# Patient Record
Sex: FEMALE | Race: WHITE | NOT HISPANIC OR LATINO | Employment: UNEMPLOYED | URBAN - METROPOLITAN AREA
[De-identification: names, ages, dates, MRNs, and addresses within clinical notes are randomized per-mention and may not be internally consistent; named-entity substitution may affect disease eponyms.]

---

## 2017-06-05 ENCOUNTER — HOSPITAL ENCOUNTER (EMERGENCY)
Facility: HOSPITAL | Age: 42
Discharge: HOME | End: 2017-06-05
Attending: EMERGENCY MEDICINE | Admitting: EMERGENCY MEDICINE
Payer: COMMERCIAL

## 2017-06-05 VITALS
WEIGHT: 162.3 LBS | OXYGEN SATURATION: 98 % | HEART RATE: 82 BPM | RESPIRATION RATE: 18 BRPM | DIASTOLIC BLOOD PRESSURE: 70 MMHG | SYSTOLIC BLOOD PRESSURE: 116 MMHG | TEMPERATURE: 98.3 F

## 2017-06-05 DIAGNOSIS — S39.012A LUMBAR STRAIN, INITIAL ENCOUNTER: Primary | ICD-10-CM

## 2017-06-05 PROCEDURE — 99283 EMERGENCY DEPT VISIT LOW MDM: CPT

## 2017-06-05 PROCEDURE — 96372 THER/PROPH/DIAG INJ SC/IM: CPT

## 2017-06-05 RX ORDER — PREDNISONE 20 MG/1
40 TABLET ORAL ONCE
Status: COMPLETED | OUTPATIENT
Start: 2017-06-05 | End: 2017-06-05

## 2017-06-05 RX ORDER — DIPHENHYDRAMINE HCL 25 MG
50 TABLET ORAL ONCE
Status: COMPLETED | OUTPATIENT
Start: 2017-06-05 | End: 2017-06-05

## 2017-06-05 RX ORDER — NAPROXEN 500 MG/1
500 TABLET ORAL 2 TIMES DAILY PRN
Qty: 15 TABLET | Refills: 0 | Status: SHIPPED | OUTPATIENT
Start: 2017-06-05 | End: 2017-06-19

## 2017-06-05 RX ORDER — METHYLPREDNISOLONE 4 MG/1
TABLET ORAL
Qty: 21 TABLET | Refills: 0 | Status: SHIPPED | OUTPATIENT
Start: 2017-06-05

## 2017-06-05 RX ORDER — ONDANSETRON 4 MG/1
4 TABLET, ORALLY DISINTEGRATING ORAL ONCE
Status: COMPLETED | OUTPATIENT
Start: 2017-06-05 | End: 2017-06-05

## 2017-06-05 RX ORDER — KETOROLAC TROMETHAMINE 30 MG/ML
30 INJECTION, SOLUTION INTRAMUSCULAR; INTRAVENOUS ONCE
Status: COMPLETED | OUTPATIENT
Start: 2017-06-05 | End: 2017-06-05

## 2017-06-05 RX ADMIN — KETOROLAC TROMETHAMINE 30 MG: 30 INJECTION, SOLUTION INTRAMUSCULAR at 20:50

## 2017-06-05 RX ADMIN — PREDNISONE 40 MG: 20 TABLET ORAL at 20:51

## 2017-06-05 RX ADMIN — ONDANSETRON 4 MG: 4 TABLET, ORALLY DISINTEGRATING ORAL at 20:10

## 2017-06-05 RX ADMIN — DIPHENHYDRAMINE HYDROCHLORIDE 50 MG: 25 TABLET ORAL at 20:09

## 2017-06-08 ENCOUNTER — HOSPITAL ENCOUNTER (EMERGENCY)
Facility: HOSPITAL | Age: 42
Discharge: HOME | End: 2017-06-08
Attending: EMERGENCY MEDICINE
Payer: COMMERCIAL

## 2017-06-08 VITALS
RESPIRATION RATE: 18 BRPM | HEART RATE: 71 BPM | DIASTOLIC BLOOD PRESSURE: 76 MMHG | OXYGEN SATURATION: 98 % | WEIGHT: 162.9 LBS | TEMPERATURE: 98.5 F | SYSTOLIC BLOOD PRESSURE: 120 MMHG

## 2017-06-08 DIAGNOSIS — M54.32 SCIATICA OF LEFT SIDE: Primary | ICD-10-CM

## 2017-06-08 PROCEDURE — 99283 EMERGENCY DEPT VISIT LOW MDM: CPT

## 2017-06-08 RX ORDER — DIPHENHYDRAMINE HCL 25 MG
25 TABLET ORAL EVERY 6 HOURS PRN
Status: DISCONTINUED | OUTPATIENT
Start: 2017-06-08 | End: 2017-06-08 | Stop reason: HOSPADM

## 2017-06-08 RX ORDER — LIDOCAINE 50 MG/G
1 PATCH TOPICAL ONCE
Status: DISCONTINUED | OUTPATIENT
Start: 2017-06-08 | End: 2017-06-08 | Stop reason: HOSPADM

## 2017-06-08 RX ORDER — MORPHINE SULFATE 15 MG/1
30 TABLET ORAL ONCE
Status: COMPLETED | OUTPATIENT
Start: 2017-06-08 | End: 2017-06-08

## 2017-06-08 RX ORDER — BUPIVACAINE HYDROCHLORIDE 5 MG/ML
30 INJECTION, SOLUTION EPIDURAL; INTRACAUDAL ONCE
Status: COMPLETED | OUTPATIENT
Start: 2017-06-08 | End: 2017-06-08

## 2017-06-08 RX ORDER — MORPHINE SULFATE 30 MG/1
30 TABLET ORAL EVERY 4 HOURS PRN
Qty: 20 TABLET | Refills: 0 | Status: SHIPPED | OUTPATIENT
Start: 2017-06-08 | End: 2017-06-28

## 2017-06-08 RX ADMIN — LIDOCAINE 1 PATCH: 50 PATCH CUTANEOUS at 19:50

## 2017-06-08 RX ADMIN — MORPHINE SULFATE 30 MG: 15 TABLET ORAL at 18:15

## 2017-06-08 RX ADMIN — BUPIVACAINE HYDROCHLORIDE 10 ML: 5 INJECTION, SOLUTION EPIDURAL; INTRACAUDAL at 18:40

## 2017-06-19 ENCOUNTER — APPOINTMENT (EMERGENCY)
Dept: RADIOLOGY | Facility: HOSPITAL | Age: 42
End: 2017-06-19
Payer: COMMERCIAL

## 2017-06-19 ENCOUNTER — HOSPITAL ENCOUNTER (EMERGENCY)
Facility: HOSPITAL | Age: 42
Discharge: HOME | End: 2017-06-19
Payer: COMMERCIAL

## 2017-06-19 VITALS
OXYGEN SATURATION: 100 % | RESPIRATION RATE: 22 BRPM | DIASTOLIC BLOOD PRESSURE: 87 MMHG | TEMPERATURE: 98.4 F | HEART RATE: 72 BPM | SYSTOLIC BLOOD PRESSURE: 155 MMHG

## 2017-06-19 DIAGNOSIS — M54.50 LOW BACK PAIN RADIATING TO LEFT LEG: Primary | ICD-10-CM

## 2017-06-19 DIAGNOSIS — M79.605 LOW BACK PAIN RADIATING TO LEFT LEG: Primary | ICD-10-CM

## 2017-06-19 PROCEDURE — 99283 EMERGENCY DEPT VISIT LOW MDM: CPT

## 2017-06-19 PROCEDURE — 72100 X-RAY EXAM L-S SPINE 2/3 VWS: CPT

## 2017-06-19 RX ORDER — NAPROXEN 500 MG/1
500 TABLET ORAL 2 TIMES DAILY PRN
Qty: 15 TABLET | Refills: 0 | Status: SHIPPED | OUTPATIENT
Start: 2017-06-19

## 2017-06-19 RX ORDER — NAPROXEN 500 MG/1
500 TABLET ORAL 2 TIMES DAILY WITH MEALS
Qty: 10 TABLET | Refills: 0 | Status: SHIPPED | OUTPATIENT
Start: 2017-06-19 | End: 2017-06-24

## 2017-06-19 RX ORDER — LIDOCAINE 50 MG/G
1 PATCH TOPICAL ONCE
Status: DISCONTINUED | OUTPATIENT
Start: 2017-06-19 | End: 2017-06-19 | Stop reason: HOSPADM

## 2017-06-19 RX ORDER — METHOCARBAMOL 500 MG/1
500 TABLET, FILM COATED ORAL 4 TIMES DAILY
Qty: 20 TABLET | Refills: 0 | Status: SHIPPED | OUTPATIENT
Start: 2017-06-19 | End: 2017-06-24

## 2017-06-19 RX ORDER — MORPHINE SULFATE 15 MG/1
15 TABLET ORAL EVERY 4 HOURS PRN
Status: DISCONTINUED | OUTPATIENT
Start: 2017-06-19 | End: 2017-06-19 | Stop reason: HOSPADM

## 2017-06-19 RX ADMIN — MORPHINE SULFATE 15 MG: 15 TABLET ORAL at 12:35

## 2017-06-19 RX ADMIN — LIDOCAINE 1 PATCH: 50 PATCH CUTANEOUS at 12:35

## 2017-06-30 ENCOUNTER — GENERIC CONVERSION - ENCOUNTER (OUTPATIENT)
Dept: OTHER | Facility: OTHER | Age: 42
End: 2017-06-30

## 2018-01-15 NOTE — MISCELLANEOUS
Message   Recorded as Task   Date: 06/19/2017 12:37 PM, Created By: Xavi Johnson   Task Name: Care Coordination   Assigned To: [facility],Team   Regarding Patient: Alessandra Meléndez, Status: In Progress   Comment:    Stephanie Pérez - 19 Jun 2017 12:37 PM     TASK CREATED  received answering service message "please call back to schedule first time appt"  unable to leave message as voice mail is not set up  No intake started  Stephanie Pérez - 22 Jun 2017 10:27 AM     TASK IN PROGRESS   Stephanie Valle - 30 Jun 2017 3:37 PM     TASK EDITED  S/W PT - SHE HAS NO INS SO WE ARE UNABLE TO ACCEPT HER AS A PT  Signatures   Electronically signed by :  Collette Rodriguez, ; Jun 30 2017  3:37PM EST                       (Author)

## 2022-04-18 ENCOUNTER — OFFICE VISIT (OUTPATIENT)
Dept: OBSTETRICS AND GYNECOLOGY | Facility: CLINIC | Age: 47
End: 2022-04-18
Payer: MEDICAID

## 2022-04-18 VITALS
HEIGHT: 64 IN | BODY MASS INDEX: 19.6 KG/M2 | DIASTOLIC BLOOD PRESSURE: 64 MMHG | SYSTOLIC BLOOD PRESSURE: 112 MMHG | WEIGHT: 114.81 LBS

## 2022-04-18 DIAGNOSIS — Z11.3 SCREEN FOR STD (SEXUALLY TRANSMITTED DISEASE): Primary | ICD-10-CM

## 2022-04-18 PROCEDURE — 1160F RVW MEDS BY RX/DR IN RCRD: CPT | Mod: CPTII,S$GLB,, | Performed by: OBSTETRICS & GYNECOLOGY

## 2022-04-18 PROCEDURE — 99203 OFFICE O/P NEW LOW 30 MIN: CPT | Mod: S$GLB,,, | Performed by: OBSTETRICS & GYNECOLOGY

## 2022-04-18 PROCEDURE — 3008F BODY MASS INDEX DOCD: CPT | Mod: CPTII,S$GLB,, | Performed by: OBSTETRICS & GYNECOLOGY

## 2022-04-18 PROCEDURE — 3074F SYST BP LT 130 MM HG: CPT | Mod: CPTII,S$GLB,, | Performed by: OBSTETRICS & GYNECOLOGY

## 2022-04-18 PROCEDURE — 99203 PR OFFICE/OUTPT VISIT, NEW, LEVL III, 30-44 MIN: ICD-10-PCS | Mod: S$GLB,,, | Performed by: OBSTETRICS & GYNECOLOGY

## 2022-04-18 PROCEDURE — 87591 N.GONORRHOEAE DNA AMP PROB: CPT | Performed by: OBSTETRICS & GYNECOLOGY

## 2022-04-18 PROCEDURE — 3008F PR BODY MASS INDEX (BMI) DOCUMENTED: ICD-10-PCS | Mod: CPTII,S$GLB,, | Performed by: OBSTETRICS & GYNECOLOGY

## 2022-04-18 PROCEDURE — 87481 CANDIDA DNA AMP PROBE: CPT | Mod: 59 | Performed by: OBSTETRICS & GYNECOLOGY

## 2022-04-18 PROCEDURE — 87491 CHLMYD TRACH DNA AMP PROBE: CPT | Performed by: OBSTETRICS & GYNECOLOGY

## 2022-04-18 PROCEDURE — 3078F DIAST BP <80 MM HG: CPT | Mod: CPTII,S$GLB,, | Performed by: OBSTETRICS & GYNECOLOGY

## 2022-04-18 PROCEDURE — 1159F MED LIST DOCD IN RCRD: CPT | Mod: CPTII,S$GLB,, | Performed by: OBSTETRICS & GYNECOLOGY

## 2022-04-18 PROCEDURE — 3074F PR MOST RECENT SYSTOLIC BLOOD PRESSURE < 130 MM HG: ICD-10-PCS | Mod: CPTII,S$GLB,, | Performed by: OBSTETRICS & GYNECOLOGY

## 2022-04-18 PROCEDURE — 1160F PR REVIEW ALL MEDS BY PRESCRIBER/CLIN PHARMACIST DOCUMENTED: ICD-10-PCS | Mod: CPTII,S$GLB,, | Performed by: OBSTETRICS & GYNECOLOGY

## 2022-04-18 PROCEDURE — 3078F PR MOST RECENT DIASTOLIC BLOOD PRESSURE < 80 MM HG: ICD-10-PCS | Mod: CPTII,S$GLB,, | Performed by: OBSTETRICS & GYNECOLOGY

## 2022-04-18 PROCEDURE — 1159F PR MEDICATION LIST DOCUMENTED IN MEDICAL RECORD: ICD-10-PCS | Mod: CPTII,S$GLB,, | Performed by: OBSTETRICS & GYNECOLOGY

## 2022-04-18 RX ORDER — BUPRENORPHINE HYDROCHLORIDE AND NALOXONE HYDROCHLORIDE DIHYDRATE 8; 2 MG/1; MG/1
TABLET SUBLINGUAL
COMMUNITY
End: 2024-02-15 | Stop reason: CLARIF

## 2022-04-18 RX ORDER — VILAZODONE HYDROCHLORIDE 40 MG/1
40 TABLET ORAL DAILY
COMMUNITY
Start: 2021-10-24

## 2022-04-18 RX ORDER — ARIPIPRAZOLE 10 MG/1
10 TABLET ORAL
COMMUNITY
Start: 2021-11-24

## 2022-04-18 NOTE — PROGRESS NOTES
Subjective:       Patient ID: Marcella Wynne is a 46 y.o. female.    Chief Complaint: STD CHECK (Patient is here for STD check.)  pt with a diff white discharge-seems different. Partner recently cheated on her and left her  Pt is tearful and has not had a check up in a long time, had a hyst at 33yo because of pelvic pain and had ovaries removed too, not on ERT-has a prescription but not taking it consistently-still had HF  HPI  Review of Systems   Genitourinary: Positive for vaginal discharge.         Objective:      Physical Exam  Vitals and nursing note reviewed. Exam conducted with a chaperone present.   Constitutional:       Appearance: Normal appearance. She is normal weight.   HENT:      Head: Normocephalic.   Pulmonary:      Effort: Pulmonary effort is normal.   Genitourinary:     General: Normal vulva.      Exam position: Lithotomy position.      Vagina: Vaginal discharge present.      Uterus: Absent.    Musculoskeletal:      Cervical back: Normal range of motion and neck supple.   Neurological:      Mental Status: She is alert.         Assessment:       Problem List Items Addressed This Visit    None     Visit Diagnoses     Screen for STD (sexually transmitted disease)    -  Primary          Plan:     Screen for STD (sexually transmitted disease)  -     Vaginosis Screen by DNA Probe  -     C. trachomatis/N. gonorrhoeae by AMP DNA [facility]; Vagina      RTC PRN

## 2022-04-20 LAB
C TRACH DNA SPEC QL NAA+PROBE: NOT DETECTED
N GONORRHOEA DNA SPEC QL NAA+PROBE: NOT DETECTED

## 2022-04-21 LAB
BACTERIAL VAGINOSIS DNA: POSITIVE
CANDIDA GLABRATA DNA: NEGATIVE
CANDIDA KRUSEI DNA: NEGATIVE
CANDIDA RRNA VAG QL PROBE: NEGATIVE
T VAGINALIS RRNA GENITAL QL PROBE: NEGATIVE

## 2022-04-22 ENCOUNTER — TELEPHONE (OUTPATIENT)
Dept: OBSTETRICS AND GYNECOLOGY | Facility: CLINIC | Age: 47
End: 2022-04-22
Payer: MEDICAID

## 2022-04-22 DIAGNOSIS — N76.0 BV (BACTERIAL VAGINOSIS): Primary | ICD-10-CM

## 2022-04-22 DIAGNOSIS — B96.89 BV (BACTERIAL VAGINOSIS): Primary | ICD-10-CM

## 2022-04-22 RX ORDER — CLINDAMYCIN HYDROCHLORIDE 300 MG/1
300 CAPSULE ORAL 2 TIMES DAILY
Qty: 14 CAPSULE | Refills: 0 | Status: SHIPPED | OUTPATIENT
Start: 2022-04-22 | End: 2024-02-15 | Stop reason: CLARIF

## 2022-04-22 NOTE — TELEPHONE ENCOUNTER
Pt left a VM wanting to get treated for BV as resulted in her chart via the portal.  I informed the Pt that I would ask  to result and tx due to  being out.  Pt expressed a verbal understanding.

## 2024-02-08 DIAGNOSIS — Z12.31 ENCOUNTER FOR SCREENING MAMMOGRAM FOR MALIGNANT NEOPLASM OF BREAST: Primary | ICD-10-CM

## 2024-02-12 DIAGNOSIS — G56.03 CARPAL TUNNEL SYNDROME, BILATERAL UPPER LIMBS: Primary | ICD-10-CM

## 2024-02-12 DIAGNOSIS — S89.91XS UNSPECIFIED INJURY OF RIGHT LOWER LEG, SEQUELA: Primary | ICD-10-CM

## 2024-02-15 ENCOUNTER — HOSPITAL ENCOUNTER (EMERGENCY)
Facility: HOSPITAL | Age: 49
Discharge: HOME | End: 2024-02-15
Attending: EMERGENCY MEDICINE
Payer: MEDICAID

## 2024-02-15 VITALS
WEIGHT: 134 LBS | SYSTOLIC BLOOD PRESSURE: 103 MMHG | DIASTOLIC BLOOD PRESSURE: 57 MMHG | BODY MASS INDEX: 22.88 KG/M2 | HEART RATE: 70 BPM | RESPIRATION RATE: 18 BRPM | HEIGHT: 64 IN | OXYGEN SATURATION: 99 % | TEMPERATURE: 98 F

## 2024-02-15 DIAGNOSIS — K59.00 CONSTIPATION, UNSPECIFIED CONSTIPATION TYPE: Primary | ICD-10-CM

## 2024-02-15 LAB
ALBUMIN SERPL BCP-MCNC: 4.4 G/DL (ref 3.5–5.2)
ALP SERPL-CCNC: 87 U/L (ref 55–135)
ALT SERPL W/O P-5'-P-CCNC: 36 U/L (ref 10–44)
ANION GAP SERPL CALC-SCNC: 10 MMOL/L (ref 8–16)
AST SERPL-CCNC: 25 U/L (ref 10–40)
BASOPHILS # BLD AUTO: 0.06 K/UL (ref 0–0.2)
BASOPHILS NFR BLD: 0.7 % (ref 0–1.9)
BILIRUB SERPL-MCNC: 0.2 MG/DL (ref 0.1–1)
BILIRUB UR QL STRIP: NEGATIVE
BUN SERPL-MCNC: 18 MG/DL (ref 6–20)
CALCIUM SERPL-MCNC: 10.1 MG/DL (ref 8.7–10.5)
CHLORIDE SERPL-SCNC: 105 MMOL/L (ref 95–110)
CLARITY UR: CLEAR
CO2 SERPL-SCNC: 27 MMOL/L (ref 23–29)
COLOR UR: YELLOW
CREAT SERPL-MCNC: 0.8 MG/DL (ref 0.5–1.4)
DIFFERENTIAL METHOD BLD: ABNORMAL
EOSINOPHIL # BLD AUTO: 0.2 K/UL (ref 0–0.5)
EOSINOPHIL NFR BLD: 2.1 % (ref 0–8)
ERYTHROCYTE [DISTWIDTH] IN BLOOD BY AUTOMATED COUNT: 13 % (ref 11.5–14.5)
EST. GFR  (NO RACE VARIABLE): >60 ML/MIN/1.73 M^2
GLUCOSE SERPL-MCNC: 94 MG/DL (ref 70–110)
GLUCOSE UR QL STRIP: NEGATIVE
HCT VFR BLD AUTO: 39.2 % (ref 37–48.5)
HGB BLD-MCNC: 12.8 G/DL (ref 12–16)
HGB UR QL STRIP: NEGATIVE
IMM GRANULOCYTES # BLD AUTO: 0.06 K/UL (ref 0–0.04)
IMM GRANULOCYTES NFR BLD AUTO: 0.7 % (ref 0–0.5)
KETONES UR QL STRIP: NEGATIVE
LEUKOCYTE ESTERASE UR QL STRIP: NEGATIVE
LIPASE SERPL-CCNC: 20 U/L (ref 4–60)
LYMPHOCYTES # BLD AUTO: 3.7 K/UL (ref 1–4.8)
LYMPHOCYTES NFR BLD: 42.9 % (ref 18–48)
MCH RBC QN AUTO: 30.8 PG (ref 27–31)
MCHC RBC AUTO-ENTMCNC: 32.7 G/DL (ref 32–36)
MCV RBC AUTO: 95 FL (ref 82–98)
MONOCYTES # BLD AUTO: 0.5 K/UL (ref 0.3–1)
MONOCYTES NFR BLD: 6.4 % (ref 4–15)
NEUTROPHILS # BLD AUTO: 4 K/UL (ref 1.8–7.7)
NEUTROPHILS NFR BLD: 47.2 % (ref 38–73)
NITRITE UR QL STRIP: NEGATIVE
NRBC BLD-RTO: 0 /100 WBC
PH UR STRIP: 7 [PH] (ref 5–8)
PLATELET # BLD AUTO: 317 K/UL (ref 150–450)
PMV BLD AUTO: 9 FL (ref 9.2–12.9)
POTASSIUM SERPL-SCNC: 4.4 MMOL/L (ref 3.5–5.1)
PROT SERPL-MCNC: 8.2 G/DL (ref 6–8.4)
PROT UR QL STRIP: NEGATIVE
RBC # BLD AUTO: 4.15 M/UL (ref 4–5.4)
SODIUM SERPL-SCNC: 142 MMOL/L (ref 136–145)
SP GR UR STRIP: 1.02 (ref 1–1.03)
URN SPEC COLLECT METH UR: NORMAL
UROBILINOGEN UR STRIP-ACNC: NEGATIVE EU/DL
WBC # BLD AUTO: 8.5 K/UL (ref 3.9–12.7)

## 2024-02-15 PROCEDURE — 83690 ASSAY OF LIPASE: CPT | Performed by: PHYSICIAN ASSISTANT

## 2024-02-15 PROCEDURE — 80053 COMPREHEN METABOLIC PANEL: CPT | Performed by: PHYSICIAN ASSISTANT

## 2024-02-15 PROCEDURE — 85025 COMPLETE CBC W/AUTO DIFF WBC: CPT | Performed by: PHYSICIAN ASSISTANT

## 2024-02-15 PROCEDURE — 99284 EMERGENCY DEPT VISIT MOD MDM: CPT | Mod: 25

## 2024-02-15 PROCEDURE — 36415 COLL VENOUS BLD VENIPUNCTURE: CPT | Performed by: PHYSICIAN ASSISTANT

## 2024-02-15 PROCEDURE — 81003 URINALYSIS AUTO W/O SCOPE: CPT | Performed by: PHYSICIAN ASSISTANT

## 2024-02-15 RX ORDER — POLYETHYLENE GLYCOL 3350 17 G/17G
17 POWDER, FOR SOLUTION ORAL DAILY
Qty: 510 G | Refills: 0 | Status: SHIPPED | OUTPATIENT
Start: 2024-02-15 | End: 2024-03-16

## 2024-02-15 NOTE — FIRST PROVIDER EVALUATION
Emergency Department TeleTriage Encounter Note      CHIEF COMPLAINT    Chief Complaint   Patient presents with    Abdominal Pain     RLQ started 3 days ago.        VITAL SIGNS   Initial Vitals [02/15/24 1530]   BP Pulse Resp Temp SpO2   121/76 86 18 98 °F (36.7 °C) 100 %      MAP       --            ALLERGIES    Review of patient's allergies indicates:   Allergen Reactions    Adhesive Swelling     Paper tape, swelling of eyes post op    Shellfish containing products Swelling    Codeine Hives    Latex, natural rubber Hives    Penicillin     Penicillin g benzathine Hives    Antihistamine-1 Nausea And Vomiting    Iodine Nausea And Vomiting    Ketorolac Nausea And Vomiting    Povidone-iodine Rash     Rash and blisters sometimes       PROVIDER TRIAGE NOTE  Patient presents with complaint of right lower abdominal pain for 3 days.  Reports no nausea or vomiting.  Reports urinary frequency.  States pain is worse when her bladder is full.  Denies dysuria or hematuria.      Phy:   Constitutional: well nourished, well developed, appearing stated age, NAD        Initial orders will be placed and care will be transferred to an alternate provider when patient is roomed for a full evaluation. Any additional orders and the final disposition will be determined by that provider.          ORDERS  Labs Reviewed - No data to display    ED Orders (720h ago, onward)      None              Virtual Visit Note: The provider triage portion of this emergency department evaluation and documentation was performed via [employer], a HIPAA-compliant telemedicine application, in concert with a tele-presenter in the room. A face to face patient evaluation with one of my colleagues will occur once the patient is placed in an emergency department room.      DISCLAIMER: This note was prepared with [employer] voice recognition transcription software. Garbled syntax, mangled pronouns, and other bizarre constructions may be attributed to that software  system.

## 2024-02-16 NOTE — ED PROVIDER NOTES
"Source of History:  Patient, chart    Chief complaint:  Abdominal Pain (RLQ started 3 days ago. )      HPI:  Marcella Wynne is a 48 y.o. female presenting with right lower quadrant abdominal pain.  Patient states pain is worse in the morning.  Patient is concerned for appendicitis.  Patient denies any associated nausea or vomiting    This is the extent to the patients complaints today here in the emergency department.    ROS: As per HPI and below:  Constitutional: No fever.  No chills.  Eyes: No visual changes.  ENT: No sore throat. No ear pain    Cardiovascular: No chest pain.  Respiratory: No shortness of breath.  GI:  Positive for abdominal pain.  No nausea.  No vomiting.  No diarrhea.  No constipation.  Genitourinary: No abnormal urination.  Neurologic: No headache. No focal weakness.  No numbness.  MSK: no back pain.  Integument: No rashes or lesions.  Hematologic: No easy bruising.  Endocrine: No excessive thirst or urination.    Review of patient's allergies indicates:   Allergen Reactions    Adhesive Swelling     Paper tape, swelling of eyes post op    Shellfish containing products Swelling    Codeine Hives    Latex, natural rubber Hives    Penicillin     Penicillin g benzathine Hives    Antihistamine-1 Nausea And Vomiting    Iodine Nausea And Vomiting    Ketorolac Nausea And Vomiting    Povidone-iodine Rash     Rash and blisters sometimes       PMH:  As per HPI and below:  History reviewed. No pertinent past medical history.  Past Surgical History:   Procedure Laterality Date    BACK SURGERY      ELBOW ARTHROPLASTY      HYSTERECTOMY         Social History     Tobacco Use    Smoking status: Every Day     Types: Cigarettes    Smokeless tobacco: Current   Substance Use Topics    Alcohol use: Yes    Drug use: Never       Physical Exam:    /69   Pulse 79   Temp 98 °F (36.7 °C) (Oral)   Resp 16   Ht 5' 4" (1.626 m)   Wt 60.8 kg (134 lb)   SpO2 100%   Breastfeeding No   BMI 23.00 kg/m²   Nursing note " and vital signs reviewed.  Constitutional: No acute distress.  Nontoxic  Eyes: No conjunctival injection.  Extraocular muscles are intact.  ENT: Oropharynx clear.  Normal phonation.  Cardiovascular: Regular rate and rhythm.  No murmurs. No gallops. No rubs  Respiratory: Clear to auscultation bilaterally.  Good air movement.  No wheezes.  No rhonchi. No rales. No accessory muscle use.  Abdomen:  Abdomen soft with generalized tenderness to palpation to right lower quadrant.  No rebound.  No guarding.  Bowel sounds within normal limits.  Musculoskeletal: Good range of motion all joints.  No deformities.  Neck supple.  No meningismus.  Skin: No rashes seen.  Good turgor.  No abrasions.  No ecchymoses.  Neuro: alert and oriented x3,  no focal neurological deficits.  Psych: Appropriate, conversant    MDM    Emergent evaluation of a 47 yo female presenting for right lower quadrant pain.  Patient states pain has been intermittent for the past 3 days.  Patient states pain is worse in the morning or when her bladder is full.  Patient states she did have bowel movement this morning with no increased pain.  No blood in stool.  On exam pt is A&Ox3. VSS. Nonfebrile and nontoxic appearing.  Mucous membranes pink and moist.  Breath sounds clear bilaterally.  Abdomen soft with generalized tenderness to palpation to right lower quadrant.  No rebound.  No guarding.  Bowel sounds within normal limits.  No rebound or guarding appreciated on exam.   BS WNL.  Pt speaking in full sentences.  Steady gait appreciated. Cap refill < 3 seconds.      History Acquisition   Additional history was acquired from other historians.  Chart    The patient's list of active medical problems, social history, medications, and allergies as documented per RN staff has been reviewed.     Differential Diagnoses   Based on available information and the initial assessment, the working differential diagnoses considered during this evaluation include but are not  limited to appendicitis, ovarian cyst, gastritis, gastroenteritis, constipation, UTI, kidney stone, others.    I will get labs, imaging and reassess.  I discussed this case with my supervising physician.      LABS     Labs Reviewed   CBC W/ AUTO DIFFERENTIAL - Abnormal; Notable for the following components:       Result Value    MPV 9.0 (*)     Immature Granulocytes 0.7 (*)     Immature Grans (Abs) 0.06 (*)     All other components within normal limits   COMPREHENSIVE METABOLIC PANEL   LIPASE   URINALYSIS, REFLEX TO URINE CULTURE    Narrative:     Specimen Source->Urine         Imaging     Imaging Results              CT Abdomen Pelvis  Without Contrast (In process)                     A radiology report was available for my review at the time of the encounter.    EKG        Additional Consideration   All available testing was considered during the course of this workup.  Comorbidities taken into consideration during the patient's evaluation and treatment include weight, age.    Social determinants of health were taken into consideration during development of our treatment plan.    Medications - No data to display   ED Course as of 02/15/24 2009   Thu Feb 15, 2024   2003 CBC unremarkable.  No leukocytosis noted.  H&H stable at 12.8 and 39.2.  CMP unremarkable.  Lipase negative.  UA negative for any acute infectious process.  Awaiting CT. [RZ]   2005 CT independently reviewed by myself and Radiology.  Negative for any acute abnormality.  Moderate stool burden noted.  Patient reassessed.  Patient updated on results.  Advised symptoms could be due to constipation.  Advised to follow up with PCP as needed.  Patient verbalized understanding of this plan of care.  All questions and concerns addressed. [RZ]   2008 Patient is hemodynamically stable, vital signs are normal. Discharge instructions given. Return to ED precautions discussed. Follow up as directed. Pt verbalized understanding of this plan.  Pt is stable for  discharge.  [RZ]      ED Course User Index  [RZ] Maranda Chavez NP             CLINICAL IMPRESSION  1. Constipation, unspecified constipation type         ED Disposition Condition    Discharge Stable            Instruction:  I see no indication of an emergent process beyond that addressed during our encounter but have duly counseled the patient/family regarding the need for prompt follow-up as well as the indications that should prompt immediate return to the emergency room should new or worrisome developments occur.  The patient/family has been provided with verbal and printed direction regarding our final diagnosis(es) as well as instructions regarding use of OTC and/or Rx medications intended to manage the patient's aforementioned conditions including:  ED Prescriptions       Medication Sig Dispense Start Date End Date Auth. Provider    polyethylene glycol (MIRALAX) 17 gram/dose powder Take 17 g by mouth once daily. 510 g 2/15/2024 3/16/2024 Maranda Chavez NP          Patient has been advised of following recommended follow-up instructions:  Follow-up Information       Follow up With Specialties Details Why Contact Info    Milagros Montejo NP Nurse Practitioner Schedule an appointment as soon as possible for a visit  As needed [address] [place] [id number]  [phone]            The patient/family communicates understanding of all this information and all remaining questions and concerns were addressed at this time.      The patient's condition did not warrant review of the  and prescription of controlled substances.      This note was created using dictation software.  This program may occasionally mistype words and phrases.         Maranda Chavez NP  02/15/24 2009

## 2024-02-16 NOTE — DISCHARGE INSTRUCTIONS
You were seen and evaluated in the ER today.  Your CT shows no sign of appendicitis.  You do have a moderate stool burden which could be causing your pain.  We have prescribed you medications for constipation.   Please follow-up with your PCP as needed.  Please return to the ED for any worsening symptoms such as chest pain, shortness of breath, fever not controlled with Tylenol or ibuprofen or uncontrolled pain.      Our goal in the emergency department is to always give you outstanding care and exceptional service. You may receive a survey by mail or e-mail in the next week regarding your experience in our ED. We would greatly appreciate your completing and returning the survey. Your feedback provides us with a way to recognize our staff who give very good care and it helps us learn how to improve when your experience was below our aspiration of excellence.

## 2024-02-20 ENCOUNTER — HOSPITAL ENCOUNTER (EMERGENCY)
Facility: HOSPITAL | Age: 49
Discharge: HOME | End: 2024-02-20
Attending: EMERGENCY MEDICINE
Payer: MEDICAID

## 2024-02-20 VITALS
HEART RATE: 71 BPM | HEIGHT: 64 IN | TEMPERATURE: 98 F | DIASTOLIC BLOOD PRESSURE: 60 MMHG | OXYGEN SATURATION: 100 % | RESPIRATION RATE: 18 BRPM | BODY MASS INDEX: 22.88 KG/M2 | WEIGHT: 134 LBS | SYSTOLIC BLOOD PRESSURE: 99 MMHG

## 2024-02-20 DIAGNOSIS — R55 SYNCOPE: ICD-10-CM

## 2024-02-20 LAB
ALBUMIN SERPL BCP-MCNC: 4 G/DL (ref 3.5–5.2)
ALP SERPL-CCNC: 78 U/L (ref 55–135)
ALT SERPL W/O P-5'-P-CCNC: 32 U/L (ref 10–44)
ANION GAP SERPL CALC-SCNC: 11 MMOL/L (ref 8–16)
AST SERPL-CCNC: 31 U/L (ref 10–40)
BASOPHILS # BLD AUTO: 0.03 K/UL (ref 0–0.2)
BASOPHILS NFR BLD: 0.3 % (ref 0–1.9)
BILIRUB SERPL-MCNC: 0.2 MG/DL (ref 0.1–1)
BILIRUB UR QL STRIP: NEGATIVE
BUN SERPL-MCNC: 12 MG/DL (ref 6–20)
CALCIUM SERPL-MCNC: 9 MG/DL (ref 8.7–10.5)
CHLORIDE SERPL-SCNC: 103 MMOL/L (ref 95–110)
CLARITY UR: CLEAR
CO2 SERPL-SCNC: 23 MMOL/L (ref 23–29)
COLOR UR: COLORLESS
CREAT SERPL-MCNC: 0.8 MG/DL (ref 0.5–1.4)
DIFFERENTIAL METHOD BLD: ABNORMAL
EOSINOPHIL # BLD AUTO: 0.1 K/UL (ref 0–0.5)
EOSINOPHIL NFR BLD: 0.5 % (ref 0–8)
ERYTHROCYTE [DISTWIDTH] IN BLOOD BY AUTOMATED COUNT: 13.2 % (ref 11.5–14.5)
EST. GFR  (NO RACE VARIABLE): >60 ML/MIN/1.73 M^2
ETHANOL SERPL-MCNC: <10 MG/DL
GLUCOSE SERPL-MCNC: 150 MG/DL (ref 70–110)
GLUCOSE UR QL STRIP: NEGATIVE
HCT VFR BLD AUTO: 36.5 % (ref 37–48.5)
HGB BLD-MCNC: 11.7 G/DL (ref 12–16)
HGB UR QL STRIP: NEGATIVE
IMM GRANULOCYTES # BLD AUTO: 0.04 K/UL (ref 0–0.04)
IMM GRANULOCYTES NFR BLD AUTO: 0.4 % (ref 0–0.5)
KETONES UR QL STRIP: NEGATIVE
LEUKOCYTE ESTERASE UR QL STRIP: NEGATIVE
LYMPHOCYTES # BLD AUTO: 2.1 K/UL (ref 1–4.8)
LYMPHOCYTES NFR BLD: 18.7 % (ref 18–48)
MCH RBC QN AUTO: 30.7 PG (ref 27–31)
MCHC RBC AUTO-ENTMCNC: 32.1 G/DL (ref 32–36)
MCV RBC AUTO: 96 FL (ref 82–98)
MONOCYTES # BLD AUTO: 0.4 K/UL (ref 0.3–1)
MONOCYTES NFR BLD: 3.4 % (ref 4–15)
NEUTROPHILS # BLD AUTO: 8.5 K/UL (ref 1.8–7.7)
NEUTROPHILS NFR BLD: 76.7 % (ref 38–73)
NITRITE UR QL STRIP: NEGATIVE
NRBC BLD-RTO: 0 /100 WBC
PH UR STRIP: 6 [PH] (ref 5–8)
PLATELET # BLD AUTO: 266 K/UL (ref 150–450)
PMV BLD AUTO: 9.4 FL (ref 9.2–12.9)
POCT GLUCOSE: 82 MG/DL (ref 70–110)
POTASSIUM SERPL-SCNC: 3.7 MMOL/L (ref 3.5–5.1)
PROT SERPL-MCNC: 7.2 G/DL (ref 6–8.4)
PROT UR QL STRIP: NEGATIVE
RBC # BLD AUTO: 3.81 M/UL (ref 4–5.4)
SODIUM SERPL-SCNC: 137 MMOL/L (ref 136–145)
SP GR UR STRIP: 1 (ref 1–1.03)
URN SPEC COLLECT METH UR: ABNORMAL
UROBILINOGEN UR STRIP-ACNC: NEGATIVE EU/DL
WBC # BLD AUTO: 11.04 K/UL (ref 3.9–12.7)

## 2024-02-20 PROCEDURE — 82962 GLUCOSE BLOOD TEST: CPT

## 2024-02-20 PROCEDURE — 82077 ASSAY SPEC XCP UR&BREATH IA: CPT | Performed by: EMERGENCY MEDICINE

## 2024-02-20 PROCEDURE — 85025 COMPLETE CBC W/AUTO DIFF WBC: CPT | Performed by: NURSE PRACTITIONER

## 2024-02-20 PROCEDURE — 99284 EMERGENCY DEPT VISIT MOD MDM: CPT | Mod: 25

## 2024-02-20 PROCEDURE — 80053 COMPREHEN METABOLIC PANEL: CPT | Performed by: NURSE PRACTITIONER

## 2024-02-20 PROCEDURE — 93005 ELECTROCARDIOGRAM TRACING: CPT

## 2024-02-20 PROCEDURE — 36415 COLL VENOUS BLD VENIPUNCTURE: CPT | Performed by: EMERGENCY MEDICINE

## 2024-02-20 PROCEDURE — 81003 URINALYSIS AUTO W/O SCOPE: CPT | Mod: 59 | Performed by: NURSE PRACTITIONER

## 2024-02-20 PROCEDURE — 93010 ELECTROCARDIOGRAM REPORT: CPT | Mod: ,,, | Performed by: GENERAL PRACTICE

## 2024-02-20 PROCEDURE — 36415 COLL VENOUS BLD VENIPUNCTURE: CPT | Performed by: NURSE PRACTITIONER

## 2024-02-20 RX ORDER — BACLOFEN 10 MG/1
10 TABLET ORAL 3 TIMES DAILY
COMMUNITY

## 2024-02-20 RX ORDER — HYDROCODONE BITARTRATE AND ACETAMINOPHEN 10; 325 MG/1; MG/1
1 TABLET ORAL
COMMUNITY

## 2024-02-20 RX ORDER — PREGABALIN 75 MG/1
75 CAPSULE ORAL 2 TIMES DAILY
COMMUNITY

## 2024-02-20 NOTE — FIRST PROVIDER EVALUATION
Emergency Department TeleTriage Encounter Note      CHIEF COMPLAINT    Chief Complaint   Patient presents with    Loss of Consciousness     Denies hitting her head       VITAL SIGNS   Initial Vitals   BP Pulse Resp Temp SpO2   02/20/24 1741 02/20/24 1741 02/20/24 1741 02/20/24 1741 02/20/24 1744   109/63 74 18 98.3 °F (36.8 °C) 98 %      MAP       --                   ALLERGIES    Review of patient's allergies indicates:   Allergen Reactions    Adhesive Swelling     Paper tape, swelling of eyes post op    Shellfish containing products Swelling    Codeine Hives    Latex, natural rubber Hives    Penicillin     Penicillin g benzathine Hives    Antihistamine-1 Nausea And Vomiting    Iodine Nausea And Vomiting    Ketorolac Nausea And Vomiting    Povidone-iodine Rash     Rash and blisters sometimes       PROVIDER TRIAGE NOTE  Verbal consent for the teletriage evaluation was given by the patient at the start of the evaluation.  All efforts will be made to maintain patient's privacy during the evaluation.      This is a teletriage evaluation of a 48 y.o. female presenting to the ED with c/o passed out yesterday AM and this AM.  Continues with lightheadedness. Not witnessed.  Limited physical exam via telehealth: The patient is awake, alert, answering questions appropriately and is not in respiratory distress.  As the Teletriage provider, I performed an initial assessment and ordered appropriate labs and imaging studies, if any, to facilitate the patient's care once placed in the ED. Once a room is available, care and a full evaluation will be completed by an alternate ED provider.  Any additional orders and the final disposition will be determined by that provider.  All imaging and labs will not be followed-up by the Teletriage Team, including myself.          ORDERS  Labs Reviewed   CBC W/ AUTO DIFFERENTIAL   COMPREHENSIVE METABOLIC PANEL   URINALYSIS, REFLEX TO URINE CULTURE   POCT GLUCOSE MONITORING CONTINUOUS       ED  Orders (720h ago, onward)      Start Ordered     Status Ordering Provider    02/20/24 1745 02/20/24 1744  Saline lock IV  Once         Ordered DAT DUARTE    02/20/24 1745 02/20/24 1744  Orthostatic blood pressure  Once         Ordered DAT DUARTE    02/20/24 1745 02/20/24 1744  Pulse Oximetry Continuous  Continuous         Ordered DAT DUARTE    02/20/24 1745 02/20/24 1744  Cardiac Monitoring - Adult  Continuous        Comments: Notify Physician If:    Ordered DAT DUARTE    02/20/24 1745 02/20/24 1744  EKG 12-lead  Once         Ordered DAT DUARTE    02/20/24 1745 02/20/24 1744  CBC auto differential  STAT         Ordered DAT DUARTE    02/20/24 1745 02/20/24 1744  Comprehensive metabolic panel  STAT         Ordered DAT DUARTE    02/20/24 1745 02/20/24 1744  POCT glucose  Once         Ordered DAT DUARTE    02/20/24 1745 02/20/24 1744  Urinalysis, Reflex to Urine Culture Urine, Clean Catch  STAT         Ordered DAT DUARTE              Virtual Visit Note: The provider triage portion of this emergency department evaluation and documentation was performed via [employer], a HIPAA-compliant telemedicine application, in concert with a tele-presenter in the room. A face to face patient evaluation with one of my colleagues will occur once the patient is placed in an emergency department room.      DISCLAIMER: This note was prepared with [employer] voice recognition transcription software. Garbled syntax, mangled pronouns, and other bizarre constructions may be attributed to that software system.

## 2024-02-21 NOTE — ED PROVIDER NOTES
Encounter Date: 2/20/2024       History     Chief Complaint   Patient presents with    Loss of Consciousness     Denies hitting her head     Chief complaint:  Passed out    HPI:  48-year-old female presents after 2 syncopal episodes the most recent this morning.  She admits to alcohol consumption last night but denies any today.  She has no headache, visual or speech impairment and denies any weakness or numbness.  She has had no chest pain or palpitations.  She has no complaints at present.  She reports that she had a headache 2 days ago but none since then.      Review of patient's allergies indicates:   Allergen Reactions    Adhesive Swelling     Paper tape, swelling of eyes post op    Shellfish containing products Swelling    Codeine Hives    Latex, natural rubber Hives    Penicillin     Penicillin g benzathine Hives    Antihistamine-1 Nausea And Vomiting    Iodine Nausea And Vomiting    Ketorolac Nausea And Vomiting    Povidone-iodine Rash     Rash and blisters sometimes     No past medical history on file.  Past Surgical History:   Procedure Laterality Date    BACK SURGERY      ELBOW ARTHROPLASTY      HYSTERECTOMY       Family History   Problem Relation Age of Onset    Diabetes Father      Social History     Tobacco Use    Smoking status: Every Day     Types: Cigarettes    Smokeless tobacco: Current   Substance Use Topics    Alcohol use: Yes    Drug use: Never     Review of Systems   Constitutional:  Negative for activity change, appetite change, chills, fatigue and fever.   Eyes:  Negative for visual disturbance.   Respiratory:  Negative for apnea and shortness of breath.    Cardiovascular:  Negative for chest pain and palpitations.   Gastrointestinal:  Negative for abdominal distention and abdominal pain.   Genitourinary:  Negative for difficulty urinating.   Musculoskeletal:  Negative for neck pain.   Skin:  Negative for pallor and rash.   Neurological:  Positive for syncope. Negative for headaches.    Hematological:  Does not bruise/bleed easily.   Psychiatric/Behavioral:  Negative for agitation.        Physical Exam     Initial Vitals   BP Pulse Resp Temp SpO2   02/20/24 1741 02/20/24 1741 02/20/24 1741 02/20/24 1741 02/20/24 1744   109/63 74 18 98.3 °F (36.8 °C) 98 %      MAP       --                Physical Exam    Nursing note and vitals reviewed.  Constitutional: She appears well-developed and well-nourished.   HENT:   Head: Normocephalic and atraumatic.   Eyes: Conjunctivae are normal.   Horizontal nystagmus   Neck: Neck supple.   Normal range of motion.  Cardiovascular:  Normal rate, regular rhythm and normal heart sounds.     Exam reveals no gallop and no friction rub.       No murmur heard.  Pulmonary/Chest: Effort normal and breath sounds normal. No respiratory distress. She has no wheezes. She has no rhonchi. She has no rales.   Abdominal: Abdomen is soft. She exhibits no distension. There is no abdominal tenderness.   Musculoskeletal:         General: Normal range of motion.      Cervical back: Normal range of motion and neck supple.     Neurological: She is alert and oriented to person, place, and time.   Skin: Skin is warm and dry. No erythema.   Psychiatric: She has a normal mood and affect.         ED Course   Procedures  Labs Reviewed   CBC W/ AUTO DIFFERENTIAL - Abnormal; Notable for the following components:       Result Value    RBC 3.81 (*)     Hemoglobin 11.7 (*)     Hematocrit 36.5 (*)     Gran # (ANC) 8.5 (*)     Gran % 76.7 (*)     Mono % 3.4 (*)     All other components within normal limits   COMPREHENSIVE METABOLIC PANEL - Abnormal; Notable for the following components:    Glucose 150 (*)     All other components within normal limits   URINALYSIS, REFLEX TO URINE CULTURE - Abnormal; Notable for the following components:    Color, UA Colorless (*)     All other components within normal limits    Narrative:     Specimen Source->Urine   ALCOHOL,MEDICAL (ETHANOL)   POCT GLUCOSE   POCT  GLUCOSE MONITORING CONTINUOUS     EKG Readings: (Independently Interpreted)   Rhythm: Normal Sinus Rhythm. Heart Rate: 66. Ectopy: No Ectopy. Conduction: Normal. ST Segments: Normal ST Segments. T Waves: Normal. Axis: Normal. Clinical Impression: Normal Sinus Rhythm       Imaging Results    None          Medications - No data to display  Medical Decision Making  48-year-old female presents with 2 syncopal episodes.  The etiology remains unclear.  She has no evidence of arrhythmia or MI.  She has no evidence of sepsis.  She recently began to antipsychotics which may prolonged QT intervals and possibly account for arrhythmia.  She is encouraged to follow up primary care and to return for more syncopal episodes.                                      Clinical Impression:  Final diagnoses:  [R55] Syncope          ED Disposition Condition    Discharge Stable          ED Prescriptions    None       Follow-up Information       Follow up With Specialties Details Why Contact Milagros Keating NP Nurse Practitioner In 1 week  [facility] [id number]  [phone]               Alphonso Ahumada III, MD  02/20/24 7825

## 2024-02-24 LAB
OHS QRS DURATION: 78 MS
OHS QTC CALCULATION: 427 MS

## 2024-03-01 ENCOUNTER — CLINICAL SUPPORT (OUTPATIENT)
Dept: REHABILITATION | Facility: HOSPITAL | Age: 49
End: 2024-03-01
Payer: MEDICAID

## 2024-03-01 ENCOUNTER — DOCUMENTATION ONLY (OUTPATIENT)
Dept: REHABILITATION | Facility: HOSPITAL | Age: 49
End: 2024-03-01

## 2024-03-01 DIAGNOSIS — Z55.9 SPECIAL EDUCATIONAL NEEDS: ICD-10-CM

## 2024-03-01 DIAGNOSIS — G56.03 CARPAL TUNNEL SYNDROME, BILATERAL UPPER LIMBS: Primary | ICD-10-CM

## 2024-03-01 DIAGNOSIS — R26.2 DIFFICULTY WALKING INVOLVING MULTIPLE JOINTS: ICD-10-CM

## 2024-03-01 DIAGNOSIS — M79.641 BILATERAL HAND PAIN: ICD-10-CM

## 2024-03-01 DIAGNOSIS — M79.642 BILATERAL HAND PAIN: ICD-10-CM

## 2024-03-01 DIAGNOSIS — R29.898 WEAKNESS OF BOTH LOWER EXTREMITIES: ICD-10-CM

## 2024-03-01 DIAGNOSIS — M24.9 HYPERMOBILE JOINTS: ICD-10-CM

## 2024-03-01 DIAGNOSIS — M25.561 CHRONIC PAIN OF RIGHT KNEE: Primary | ICD-10-CM

## 2024-03-01 DIAGNOSIS — Z74.09 DECREASED FUNCTIONAL MOBILITY AND ENDURANCE: ICD-10-CM

## 2024-03-01 DIAGNOSIS — R26.89 BALANCE DISORDER: ICD-10-CM

## 2024-03-01 DIAGNOSIS — G89.29 CHRONIC PAIN OF RIGHT KNEE: Primary | ICD-10-CM

## 2024-03-01 PROCEDURE — 97530 THERAPEUTIC ACTIVITIES: CPT | Mod: PN

## 2024-03-01 PROCEDURE — 97163 PT EVAL HIGH COMPLEX 45 MIN: CPT | Mod: PN

## 2024-03-01 PROCEDURE — 97165 OT EVAL LOW COMPLEX 30 MIN: CPT | Mod: PN

## 2024-03-01 SDOH — SOCIAL DETERMINANTS OF HEALTH (SDOH): PROBLEMS RELATED TO EDUCATION AND LITERACY, UNSPECIFIED: Z55.9

## 2024-03-01 NOTE — PATIENT INSTRUCTIONS
3-1-24  -avoid painful use  -avoid forceful gripping, pinching  -minimize repetitive use of wrist and hand, minimize sustained use of wrist and hand  -avoid extreme wrist postures  -try to recruit help with chores  -take breaks with tasks before you need to so you can discourage overuse of the hand  -wear wrist splints when sleeping  -do below exercise 2 sets of 20 every day for 2 weeks; continue if symptoms persist  *with right elbow by side in L shape, forearm turned down, and wrist straight, use left hand to push right index and long towards ceiling, relax; repeat  *do above exercise but raise thumb instead of index and long    *do the same 2 above exercises but on left side  -decrease nicotine and caffeine as much as possible

## 2024-03-01 NOTE — PLAN OF CARE
[facility] Occupational Therapy  Initial Evaluation     Date: 3/1/2024  Name: Marcella Fregoso  Clinic Number: [id number]    Therapy Diagnosis:   Encounter Diagnoses   Name Primary?    Carpal tunnel syndrome, bilateral upper limbs Yes    Bilateral hand pain      Physician: Milagros Montejo NP    Physician Orders: evaluate and tx, see epic  Medical Diagnosis: bilateral carpal tunnel syndrome  Surgical Procedure and Date: n/a, n/a      See surgical report for details if applicable      Evaluation Date: 3/1/2024  Insurance Authorization Period Expiration: see epic            Plan of Care Certification Period: 3-1-24 thru 5-24-24                            Date of Return to referral source's office: 3-18-24    Visit # / Visits authorized: see epic   Time In:1  Time Out: 130  Total Billable Time: 15 minutes  Precautions:  universal, see epic, protocol if applicable  Subjective     She reports seeing a neurologist recently for symptoms through body and leg weakness    Involved Side: both  Dominant Side: right  Date of Onset: right about 8 years ago, left about 2 years ago  History of Current Condition/Mechanism of Injury: insidious onset, can't recall if she has had imaging or testing for carpal tunnel syndrome  Imaging: see epic  Previous Therapy: none for above dx    Past Medical History/Physical Systems Review:   Marcella Fregoso  has no past medical history on file.    Marcella Fregoso  has no past surgical history on file.    Marcella currently has no medications in their medication list.    Review of patient's allergies indicates:  Not on File     Patient's Goals for Therapy: full painless use    Pain:  Functional Pain Scale Rating 0-10:   4/10 on average  0/10 at best  8/10 at worst  Side:bilateral  Location: diffuse wrist  Description: sharp  Aggravating Factors: some use  Easing Factors: rest  Occupation:  not working/on disability due to neuropathy  Working presently: no  Duties: per job if working; typical self  and home care    Functional Limitations/Social History:    Previous functional status includes: Independent with all ADLs.     Current Functional Status   Home/Living environment : lives with best friend    Limitation of Functional Status as follows: decreased use and strength with ADL   ADLs/IADLs:               See above   Leisure: not tested  pain meds: denies  nicotine: vapes daily  caffeine: 2-3 cups regular coffee daily    Objective   Posture:wnl  Palpation:not tested  Sensation:intermittent diffuse burning bilateral hands  ROM:prom full wrist and digits    DME:says has bilateral prefabricated wrist splints she wears when sleeping and sometimes when awake        CMS Impairment/Limitation/Restriction for FOTO Survey    Therapist reviewed FOTO scores for Marcella Fregoso on 3/1/2024.   FOTO documents entered into [employer] - see Media section.                 Treatment     Treatment Time In: 115  Treatment Time Out: 130  Total Treatment time separate from Evaluation time:16      Christi received therapeutic exercises for 15 minutes including:  -see above and/or media section                Home Exercise Program/Education:  Issued HEP (see patient instructions in EMR in media or note) . Exercises were reviewed and Christi was able to demonstrate them prior to the end of the session.   Pt received a written copy of exercises to perform at home. Christi demonstrated good understanding of the education provided.  Pt was advised to perform these exercises free of pain, and to stop performing them if pain occurs.    Patient/Family Education: role of OT, goals for OT, scheduling/cancellations - pt verbalized understanding. Discussed insurance limitations with patient.    Additional Education provided: likely tx progression, expectations of rehab      Explained a home program is the extent of the rehab I think she needs    Assessment     Marcella Fregoso is a 48 y.o. female referred to outpatient occupational therapy and presents with a medical  diagnosis of see above resulting in Decreased functional hand use and Increased pain and demonstrates limitations as described in the chart below. Following medical record review it is determined that pt will benefit from occupational therapy services in order to maximize pain free and/or functional use of both hands. The following goals were discussed with the patient and patient is in agreement with them as to be addressed in the treatment plan. The patient's rehab potential is fair.     Anticipated barriers to occupational therapy: chronicity of condition  Pt has no cultural, educational or language barriers to learning provided.    Profile and History Assessment of Occupational Performance Level of Clinical Decision Making Complexity Score   Occupational Profile:   Marcella Fregoso is a 48 y.o. female who lives with an adult  and is on disability Marcella Fregoso has difficulty with  ADLs and IADLs as listed previously, which  affecting his/her daily functional abilities.      Comorbidities:    has no past medical history on file.    Medical and Therapy History Review:   Brief               Performance Deficits    Physical:  Pain    Cognitive:  No Deficits    Psychosocial:    No Deficits     Clinical Decision Making:  low    Assessment Process:  Problem-Focused Assessments    Modification/Need for Assistance:  Not Necessary    Intervention Selection:  Limited Treatment Options       low  Based on PMHX, co morbidities , data from assessments and functional level of assistance required with task and clinical presentation directly impacting function.           The following goals were discussed with the patient and patient is in agreement with them as to be addressed in the treatment plan.     Goals:    1. Patient will be I with HEP -met 3-1-24  Plan   Certification Period/Plan of care expiration: 3/1/2024 to             5-24-24    Outpatient Occupational Therapy  : eval and tx    Above frequency and duration in above  dates may change based on patient progress and need for therapy    Jerry ESCAMILLA CHT      I certify the need for the services furnished under this plan of care.    doctor's signature/referring provider's signature:______________________________________________________

## 2024-03-02 PROBLEM — M25.561 CHRONIC PAIN OF RIGHT KNEE: Status: ACTIVE | Noted: 2024-03-02

## 2024-03-02 PROBLEM — R26.89 BALANCE DISORDER: Status: ACTIVE | Noted: 2024-03-02

## 2024-03-02 PROBLEM — M24.9 HYPERMOBILE JOINTS: Status: ACTIVE | Noted: 2024-03-02

## 2024-03-02 PROBLEM — R29.898 WEAKNESS OF BOTH LOWER EXTREMITIES: Status: ACTIVE | Noted: 2024-03-02

## 2024-03-02 PROBLEM — Z74.09 DECREASED FUNCTIONAL MOBILITY AND ENDURANCE: Status: ACTIVE | Noted: 2024-03-02

## 2024-03-02 PROBLEM — G89.29 CHRONIC PAIN OF RIGHT KNEE: Status: ACTIVE | Noted: 2024-03-02

## 2024-03-02 PROBLEM — Z55.9 SPECIAL EDUCATIONAL NEEDS: Status: ACTIVE | Noted: 2024-03-02

## 2024-03-02 PROBLEM — R26.2 DIFFICULTY WALKING INVOLVING MULTIPLE JOINTS: Status: ACTIVE | Noted: 2024-03-02

## 2024-03-02 NOTE — PLAN OF CARE
[facility]   Physical Therapy Initial Evaluation     Date: 3/1/2024   Name: Marcella Fregoso  Clinic Number: [id number]    Therapy Diagnosis:   Encounter Diagnoses   Name Primary?    Chronic pain of right knee Yes    Weakness of both lower extremities     Hypermobile joints     Difficulty walking involving multiple joints     Decreased functional mobility and endurance     Special educational needs     Balance disorder      Physician: Milagros Montejo, NP    Physician Orders: PT Eval and Treat   Medical Diagnosis from Referral: S89.91XS (ICD-10-CM) - Unspecified injury of right lower leg, sequela  Evaluation Date: 3/1/2024  Authorization Period Expiration: 02/11/2024  Plan of Care Expiration: 04/19/2024 at 2x/wk x 6 weeks  Progress Note Due: 04/01/2024  Visit # / Visits authorized: 1/1   FOTO: 0/3      Time In: 2:43 pm  Time Out: 3:17 pm  Total Appointment Time (timed & untimed codes): 34 minutes    Precautions: Standard and Fall  SUBJECTIVE   Date of onset: The patient reports that her neurological condition began approximately three years ago. She reports the injury to her right knee occurred approximately 6-7 months ago.    History of current condition - Christi reports: as a back drop that she has had three previous lumbar surgeries to the same L4-L5 area of her spine. Her initial surgery was in 2013 when she had rods put in to stabilize the area. Her next surgery was in 2014 when they went in and removed the rods and replaced them with a cage. Her final surgery was in 2015 when they had to go in and remove the cage as she was allergic to the hardware. Currently, she reports there is no hardware in her lumbar area. She reports that at the last surgery she was told that there was a disc bulging at L5-S1, but that it did not need addressing at that time. She had no new complaints until approximately three years ago. She reports no known mechanism of injury. She woke up one day and could not feel  her feet. She immediately called her lumbar surgeon. She had diagnostic tests performed including a Nerve Conduction Velocity test. She was told that she did not have a neuropathy at that time. However, her pain and lower extremity numbness worsened over the years. She reports that her balance got so bad and that she was falling so much that she lost her job. This caused her to eventually lose her car and her apartment. Today, she tells PT that approximately 6-7 months ago she was walking to a store across the street from where she was staying and fell three separate times due to her loss of balance. She reports that she fell each time onto her right knee. After the third fall, she had to call an ambulance. They only took X-rays at the hospital. She was told at the hospital that she had a fractured leg (verbiage from the patient, no past medical history is available). Due to financial reasons and losing her job, she had no medical insurance at the time. She continued to have pain. About seven weeks later, she went and saw a M.D. in [place] that she was familiar with. She reports he chiki off some dark blood, and he told her there was something else going on in her right knee. He wanted to get a Magnetic Resonance Image. She reports he thought there may be internal damage. However, having no insurance, she could not have a Magnetic Resonance Image to confirm any type of diagnosis. Today, she reports continued right knee pain. She is having to now ambulate with a cane. She requires tactile assist at times. She reports that she is about to have diagnostic tests performed to see if her current neurological complaints are from Multiple Sclerosis or from Possible Lumbar spinal injuries. Also, she reports that her referring provider wanted her to try PT for the knee pain and balance issues. She is hoping to get a Magnetic Resonance Image of the right knee.      Falls: The patient reports that she falls 2-3 times daily.  "Her last fall was 02/29/2024. She reports that she fell on her left knee at that time. She reports that now her left knee is starting to hurt from the falls. She reports that she fell approximately one year ago and broke her ankle and her heel on the left lower leg. She did not get surgery.    Imaging: There is no recent imaging available.     Prior Therapy: She reports that she has not had PT for her balance issues or her right knee pain.  Social History: She is now living with her best friend and her best friend's two grown sons. She has physical help from them when needed.   Occupation: She is unable to work.   Prior Level of Function: Prior to the falls that hurt her right knee, she did not have to ambulate full time with an assistive device. She was continent. She had no right knee pain. She had bilateral lower extremity numbness, but it was not as severe as it is now.  Current Level of Function: She requires an assistive device or tactile or hand held assist when ambulating for her safety. She is falling multiple times a day. Her pain levels remain intolerable at times. She reports that she now has bilateral lower extremity numbness that is so bad at times that she can not feel her shoes coming off and at times she has not felt her socks coming off. She is becoming incontinent. She reports that her ability to walk is worse in the morning, and it "seems to get better throughout the day."    Pain:  Current 6/10, worst 9/10, best 6/10   Location: The right knee.  Description: Aching, Sharp, and Variable  Aggravating Factors: Standing, Walking, Lifting, and Getting out of bed/chair  Easing Factors: nothing    Patients goals: "I would love for my balance to at least get a little better. I would love for my right knee to not hurt so much. I am scared that as I keep falling that I may hurt my knee or even something else much worse."     Medical History:   No past medical history on file.    Surgical History:   Marcella" Ildefonso  has no past surgical history on file.    Medications:   Marcella currently has no medications in their medication list.    Allergies:   Review of patient's allergies indicates:  Not on File     OBJECTIVE     Observation: (Notices by this PT during this evaluation). She enters with an Ataxic gait. She is using a standard cane. Bilateral hypermobile lower extremity joints are noted during gait. She has decreased heel strike and toe off bilaterally. Her cherrie is slow. Her stride length is shortened. She appears to have a wide base of support in both static and dynamic standing. She has increased chance for bilateral inversion injuries as she strikes the ground supinated. When supine, her feet rested in Inversion. Also, while supine she had uncontrollable bilateral lower extremity spasms. She could not control them. She could not predict them. Some occurred during activity. For example, they occurred when she was attempting to perform a bridge.    Range of Motion:   Knee Left active Right Active   Flexion   WFLs   WFLs   Extension   WFLs   WFLs       Lower Extremity Strength: NOTE: The patient's bilateral lower extremity test scores are as compared to her right and left sides instead of the norm. Some scores were limited by lack of control, and some were limited by breakaway testing. She may benefit from bilateral ankle braces for support.    Right LE  Left LE    Knee extension: 4-/5 Knee extension: 3+/5   Knee flexion: 3+/5 Knee flexion: 4-/5   Hip flexion: 4-/5 Hip flexion: 4-/5   Hip extension:  3+/5 Hip extension: 3+/5   Hip abduction: 3+/5 Hip abduction: 3+/5   Hip adduction: 4-/5 Hip adduction 4-/5   Ankle dorsiflexion: 3/5 Ankle dorsiflexion: 3/5   Ankle plantarflexion: 3+/5 Ankle plantarflexion: 3+/5   Ankle Inversion 3+/5 Ankle Inversion 3+/5   Ankle Eversion 3/5 Ankle Eversion 3/5       Special Tests: Results were from comparing her left side to her right side. Her hypermobility was bilateral which caused  some tests to appear positive. She had right knee pain with some testing. However, assessing possible right knee ligament damage was limited due to her bilateral hypermobility. When present with testing, her pain was mostly at the lateral joint line.     Left Right   Valgus Stress Test (MCL) Negative Pain (at lateral joint line)   Varus Stress test (LCL) Negative Negative   Lachman's test (ACL) Negative Negative   Posterior Lachman (PCL) Negative Negative   Bipin's Test (Meniscus) Negative Pain (at lateral joint line)   Patellar Grind Test (PFS/OA) Negative General area knee pain       Joint Mobility: Bilateral patella hypermobility is noted. Possible J-sign on the right. Her right patella does not sit straight as her left patella does. The superior portion of her right patella points medially. This is noted when she is sitting on the mat at rest with her legs out in from of her.     Palpation: To this PT, there was no noticeable difference in size of the patella tendon bursa bilaterally. However, the right patella tendon bursa was tender to palpation. She was also tender to palpation on the medial and lateral sides of the right patella when medial and lateral joint mobility was being performed. She was more tender medially then laterally.     Sensation: Significant bilateral decreased sensation is noted. She can benefit from further testing. At this evaluation, her light touch ability seemed better then her pin prick ability.      Limitation/Restriction for FOTO N/A Survey    Therapist reviewed FOTO scores for Marcella Fregoso on 3/1/2024.   FOTO documents entered into [employer] - see Media section.    Limitation Score: N/A at this evaluation.       TREATMENT     Total Treatment time (time-based codes) separate from Evaluation: 0 minutes     PATIENT EDUCATION AND HOME EXERCISES     Education provided:   -The patient received verbal education on the findings from her evaluation. She was without questions.     ASSESSMENT      Marcella is a 48 y.o. female referred to outpatient Physical Therapy with a medical diagnosis of S89.91XS (ICD-10-CM) - Unspecified injury of right lower leg, sequela. However, her lack of balance appears to be her primary problem. Damages to her body that occur from her falls appear to be a result. For example, she appears this day with chronic right knee pain from a fall several months ago. She has fallen multiple times since then. Now, she also reports pain in her left knee. Her right knee did test positive for pain with some testing; however, assessing possible ligament damage was difficult due to her bilateral hypermobility. She can benefit from: neurological testing to help improve her balance, strengthening, endurance activities, gait training, functional ability improvement, and special education needs among other things.     Patient prognosis is Guarded.   Patientt will benefit from skilled outpatient Physical Therapy to address the deficits stated above and in the chart below, provide patient /family education, and to maximize patientt's level of independence.     Plan of care discussed with patient: Yes  Patient's spiritual, cultural and educational needs considered and patient is agreeable to the plan of care and goals as stated below:     Anticipated Barriers for therapy: Not having a proper diagnosis at this time.    Medical Necessity is demonstrated by the following  History  Co-morbidities and personal factors that may impact the plan of care Co-morbidities:   coping style/mechanism, depression, difficulty sleeping, financial considerations, level of undertstanding of current condition, transportation assistance required, and joint hypermobility.    Personal Factors:   education level  coping style  social background  lifestyle  character  attitudes     high   Examination  Body Structures and Functions, activity limitations and participation restrictions that may impact the plan of care Body Regions:    back  lower extremities  upper extremities  trunk    Body Systems:    gross symmetry  strength  gross coordinated movement  balance  gait  transfers  motor control  motor learning  And pain control    Participation Restrictions:   None. She is motivated to improve.     Activity limitations:   Learning and applying knowledge  no deficits    General Tasks and Commands  undertaking multiple tasks    Communication  no deficits    Mobility  lifting and carrying objects  fine hand use (grasping/picking up)  walking  moving around using equipment (WC)  driving (bike, car, motorcycle)    Self care  washing oneself (bathing, drying, washing hands)  dressing  looking after one's health    Domestic Life  shopping  cooking  doing house work (cleaning house, washing dishes, laundry)  assisting others    Interactions/Relationships  no deficits    Life Areas  employment    Community and Social Life  community life  recreation and leisure         high   Clinical Presentation unstable clinical presentation with unpredictable characteristics high   Decision Making/ Complexity Score: high     Goals:    STG  Weeks/Visits Date Established  Date Met   1.The patient to tolerate this initial PT evaluation without fall or distress and for her to be without questions afterwards.  1 visit 3/1/2024   Goal Met   2. The patient to transfer to a neuro out patient PT clinic to better help address her current needs. New goals to be established at that time, 1 visit 3/1/2024   In Progress as the patient is to set up her visit at the neuro facility after this evaluation.     PLAN   Plan of care Certification: 3/1/2024 to 04/19/2024.    Outpatient Physical Therapy 2 times weekly for 6 weeks to include the following interventions: Electrical Stimulation unattended or Citizen of [place], Gait Training, Manual Therapy, Moist Heat/ Ice, Neuromuscular Re-ed, Orthotic Management and Training, Patient Education, Self Care, Therapeutic Activities, Therapeutic  Exercise, and care by a PTA.     Joseph Rodriguez PT      I CERTIFY THE NEED FOR THESE SERVICES FURNISHED UNDER THIS PLAN OF TREATMENT AND WHILE UNDER MY CARE   Physician's comments:     Physician's Signature: ___________________________________________________

## 2024-03-04 ENCOUNTER — DOCUMENTATION ONLY (OUTPATIENT)
Dept: REHABILITATION | Facility: HOSPITAL | Age: 49
End: 2024-03-04
Payer: MEDICAID

## 2024-03-04 NOTE — PROGRESS NOTES
Outpatient Therapy Discharge Summary     Date: 3-4-24      Name: Marcella Fregoso  [facility] Number: [id number]    Therapy Diagnosis: No diagnosis found.  Physician: No ref. provider found    Physician Orders: see evaluation  Medical Diagnosis: see evaluation  Evaluation Date: 3-1-24      Date of Last visit: 3-1-24  Total Visits Received: 1  Cancelled Visits: see epic  No Show Visits: see epic    Assessment    Goals: see last note    Discharge reason: Patient has reached the maximum rehab potential for the present time    Plan   This patient is discharged from Occupational Therapy

## 2024-04-03 DIAGNOSIS — R29.818 POOR TRUNK CONTROL: Primary | ICD-10-CM

## 2024-04-03 DIAGNOSIS — G35 MULTIPLE SCLEROSIS: ICD-10-CM

## 2024-04-03 DIAGNOSIS — R27.0 ATAXIA: ICD-10-CM

## 2024-04-03 DIAGNOSIS — R20.0 ANESTHESIA OF SKIN: Primary | ICD-10-CM

## 2024-04-03 DIAGNOSIS — R27.8 DYSMETRIA: ICD-10-CM

## 2024-04-03 DIAGNOSIS — R32 URINE INCONTINENCE: ICD-10-CM

## 2024-04-03 DIAGNOSIS — Z98.890 STATUS POST LUMBAR SURGERY: ICD-10-CM

## 2024-04-03 DIAGNOSIS — R20.0 FACIAL NUMBNESS: ICD-10-CM

## 2024-04-03 DIAGNOSIS — R20.2 PARESTHESIA: ICD-10-CM

## 2024-04-19 ENCOUNTER — HOSPITAL ENCOUNTER (OUTPATIENT)
Dept: RADIOLOGY | Facility: HOSPITAL | Age: 49
Discharge: HOME | End: 2024-04-19
Attending: NURSE PRACTITIONER
Payer: MEDICAID

## 2024-04-19 DIAGNOSIS — Z98.890 STATUS POST LUMBAR SURGERY: ICD-10-CM

## 2024-04-19 DIAGNOSIS — G35 MULTIPLE SCLEROSIS: ICD-10-CM

## 2024-04-19 DIAGNOSIS — R27.0 ATAXIA: ICD-10-CM

## 2024-04-19 DIAGNOSIS — R20.0 ANESTHESIA OF SKIN: ICD-10-CM

## 2024-04-19 DIAGNOSIS — R27.8 DYSMETRIA: ICD-10-CM

## 2024-04-19 DIAGNOSIS — R20.0 FACIAL NUMBNESS: ICD-10-CM

## 2024-04-19 DIAGNOSIS — R32 URINE INCONTINENCE: ICD-10-CM

## 2024-04-19 DIAGNOSIS — R29.818 POOR TRUNK CONTROL: ICD-10-CM

## 2024-04-19 DIAGNOSIS — R20.2 PARESTHESIA: ICD-10-CM

## 2024-04-19 PROCEDURE — 72148 MRI LUMBAR SPINE W/O DYE: CPT | Mod: TC,PO

## 2024-04-19 PROCEDURE — 25500020 PHARM REV CODE 255: Mod: PO | Performed by: NURSE PRACTITIONER

## 2024-04-19 PROCEDURE — A9585 GADOBUTROL INJECTION: HCPCS | Mod: PO | Performed by: NURSE PRACTITIONER

## 2024-04-19 PROCEDURE — 72156 MRI NECK SPINE W/O & W/DYE: CPT | Mod: TC,PO

## 2024-04-19 PROCEDURE — 70553 MRI BRAIN STEM W/O & W/DYE: CPT | Mod: TC,PO

## 2024-04-19 RX ORDER — GADOBUTROL 604.72 MG/ML
6.5 INJECTION INTRAVENOUS
Status: COMPLETED | OUTPATIENT
Start: 2024-04-19 | End: 2024-04-19

## 2024-04-19 RX ADMIN — GADOBUTROL 6.5 ML: 604.72 INJECTION INTRAVENOUS at 03:04

## 2024-05-06 ENCOUNTER — TELEPHONE (OUTPATIENT)
Dept: NEUROSURGERY | Facility: CLINIC | Age: 49
End: 2024-05-06
Payer: MEDICAID

## 2024-05-06 NOTE — TELEPHONE ENCOUNTER
Confirmed insurance with pt, and informed her that unfortunately we are unable to schedule new pts with her current insurance. Pt was disappointed but thanked this nurse for calling her.

## 2024-05-06 NOTE — TELEPHONE ENCOUNTER
----- Message from Shweta Veras RN sent at 5/6/2024 10:01 AM CDT -----  Please return call to pt and confirm that her insurance is Medicaid. If it is Medicaid, please inform pt that we are unable to schedule her insurance for new patients. Thank you.  ----- Message -----  From: Lefort, Stacey M  Sent: 5/6/2024   9:44 AM CDT  To: [facility] Staff    Pt is calling to schedule an appt - based on a referral. Her callback is  [phone]